# Patient Record
Sex: MALE | URBAN - METROPOLITAN AREA
[De-identification: names, ages, dates, MRNs, and addresses within clinical notes are randomized per-mention and may not be internally consistent; named-entity substitution may affect disease eponyms.]

---

## 2021-02-23 ENCOUNTER — APPOINTMENT (RX ONLY)
Dept: URBAN - METROPOLITAN AREA CLINIC 112 | Facility: CLINIC | Age: 26
Setting detail: DERMATOLOGY
End: 2021-02-23

## 2021-02-23 DIAGNOSIS — L21.8 OTHER SEBORRHEIC DERMATITIS: ICD-10-CM

## 2021-02-23 DIAGNOSIS — L65.9 NONSCARRING HAIR LOSS, UNSPECIFIED: ICD-10-CM

## 2021-02-23 PROCEDURE — 99203 OFFICE O/P NEW LOW 30 MIN: CPT

## 2021-02-23 PROCEDURE — ? DEFER

## 2021-02-23 PROCEDURE — ? COUNSELING

## 2021-02-23 NOTE — PROCEDURE: COUNSELING
Detail Level: Detailed
Patient Specific Counseling (Will Not Stick From Patient To Patient): -\\nRecommended increasing frequency of washing hair
Detail Level: Zone

## 2021-02-23 NOTE — PROCEDURE: DEFER
Procedure To Be Performed At Next Visit: Prescription
Reason To Defer Override: declines treatment today, hair loss is not bothersome enough for patient now to take daily medication. He will reconsider treatment if hair loss continues to progress
Introduction Text (Please End With A Colon): The following was deferred today:
Detail Level: Detailed
Introduction Text (Please End With A Colon): The following was deferred today
Reason To Defer Override: declined treatment today, flaking is not bothersome to patient at this time

## 2021-02-23 NOTE — HPI: HAIR LOSS
How Did The Hair Loss Occur?: sudden in onset
Additional History: He estimates loosing about 300-400 hairs daily. He has noticed his scalp is more noticeable. He reports possibility of increased stress with his move to [place] - that’s when he first noticed an increase in shedding. He notices the most shedding when he washes his hair. He tired using peppermint oil - it only created dandruff.